# Patient Record
Sex: MALE | NOT HISPANIC OR LATINO | URBAN - METROPOLITAN AREA
[De-identification: names, ages, dates, MRNs, and addresses within clinical notes are randomized per-mention and may not be internally consistent; named-entity substitution may affect disease eponyms.]

---

## 2019-01-10 ENCOUNTER — OFFICE VISIT (OUTPATIENT)
Dept: URBAN - METROPOLITAN AREA CLINIC 76 | Facility: CLINIC | Age: 64
End: 2019-01-10
Payer: MEDICARE

## 2019-01-10 DIAGNOSIS — H35.3131 NONEXUDATIVE AGE-RELATED MACULAR DEGENERATION, BILATERAL, EARLY DRY STAGE: ICD-10-CM

## 2019-01-10 PROCEDURE — 92014 COMPRE OPH EXAM EST PT 1/>: CPT | Performed by: OPTOMETRIST

## 2019-01-10 ASSESSMENT — KERATOMETRY
OD: 46.88
OS: 47.00

## 2019-01-10 ASSESSMENT — INTRAOCULAR PRESSURE
OS: 16
OD: 16

## 2019-01-10 NOTE — IMPRESSION/PLAN
Impression: Age-related nuclear cataract, bilateral: H25.13 OU. Plan: Discussed diagnosis with patient. No treatment required at this time. Call if symptoms worsen.

## 2019-01-10 NOTE — IMPRESSION/PLAN
Impression: Nonexudative age-related macular degeneration, bilateral, early dry stage: H35.3131. OU. Plan: Discussed diagnosis in detail with patient. Counseling given about the benefits and/or risks of the Age-Related Eye Disease Study (AREDS) formulation for preventing progression of age-related macular degeneration (AMD). Add lutein 20-30 mg, zeaxanthin 2-5mg per day. Will continue to observe condition and or symptoms. Call if [place] worsens. Dispensed and explained use of [employer] grid.

## 2021-01-14 ENCOUNTER — OFFICE VISIT (OUTPATIENT)
Dept: URBAN - METROPOLITAN AREA CLINIC 76 | Facility: CLINIC | Age: 66
End: 2021-01-14
Payer: MEDICARE

## 2021-01-14 PROCEDURE — 99214 OFFICE O/P EST MOD 30 MIN: CPT | Performed by: OPTOMETRIST

## 2021-01-14 ASSESSMENT — INTRAOCULAR PRESSURE
OS: 16
OD: 16

## 2021-01-14 ASSESSMENT — KERATOMETRY
OS: 46.63
OD: 47.13

## 2021-01-14 NOTE — IMPRESSION/PLAN
Impression: Nonexudative age-related macular degeneration, bilateral, early dry stage: H35.3131. OU. Plan: Discussed diagnosis in detail with patient. Counseling given about the benefits and/or risks of the Age-Related Eye Disease Study (AREDS) formulation for preventing progression of age-related macular degeneration (AMD). Add lutein 20-30 mg, zeaxanthin 2-5mg per day. Will continue to observe condition and or symptoms. Call if [address] worsens. Dispensed and explained use of [employer] grid.

## 2021-01-14 NOTE — IMPRESSION/PLAN
Impression: Age-related nuclear cataract, bilateral: H25.13 OU. Plan: Rediscussed diagnosis with patient. No treatment required at this time. Call if symptoms worsen.

## 2022-02-17 ENCOUNTER — OFFICE VISIT (OUTPATIENT)
Dept: URBAN - METROPOLITAN AREA CLINIC 76 | Facility: CLINIC | Age: 67
End: 2022-02-17
Payer: MEDICARE

## 2022-02-17 DIAGNOSIS — H25.13 AGE-RELATED NUCLEAR CATARACT, BILATERAL: ICD-10-CM

## 2022-02-17 DIAGNOSIS — H52.4 PRESBYOPIA: ICD-10-CM

## 2022-02-17 PROCEDURE — 92014 COMPRE OPH EXAM EST PT 1/>: CPT | Performed by: OPTOMETRIST

## 2022-02-17 ASSESSMENT — KERATOMETRY
OS: 47.13
OD: 46.75

## 2022-02-17 ASSESSMENT — INTRAOCULAR PRESSURE
OS: 17
OD: 17

## 2022-02-17 ASSESSMENT — VISUAL ACUITY
OD: 20/30
OS: 20/30

## 2022-02-17 NOTE — IMPRESSION/PLAN
Impression: Presbyopia: H52.4. Bilateral. Plan: Discussed condition. New mrx given today. Pt to call with any concerns. Will dispense MRx when the refraction fee is paid Q$52. Pt and caregiver understand.

## 2022-02-17 NOTE — IMPRESSION/PLAN
Impression: Nonexudative age-related macular degeneration, bilateral, early dry stage: H35.3131. OU. Plan: Rediscussed diagnosis in detail with patient. Counseling given about the benefits and/or risks of the Age-Related Eye Disease Study (AREDS) formulation for preventing progression of age-related macular degeneration (AMD). Add lutein 20-30 mg, zeaxanthin 2-5mg per day. Will continue to observe condition and or symptoms. Call if [address] worsens. Dispensed and explained use of [employer] grid.

## 2023-02-16 ENCOUNTER — OFFICE VISIT (OUTPATIENT)
Dept: URBAN - METROPOLITAN AREA CLINIC 76 | Facility: CLINIC | Age: 68
End: 2023-02-16
Payer: MEDICARE

## 2023-02-16 DIAGNOSIS — H35.54 DYSTROPHIES PRIMARILY INVOLVING THE RETINAL PIGMENT EPITHELIUM: Primary | ICD-10-CM

## 2023-02-16 DIAGNOSIS — H25.13 AGE-RELATED NUCLEAR CATARACT, BILATERAL: ICD-10-CM

## 2023-02-16 PROCEDURE — 99214 OFFICE O/P EST MOD 30 MIN: CPT | Performed by: OPTOMETRIST

## 2023-02-16 ASSESSMENT — INTRAOCULAR PRESSURE
OS: 17
OD: 17

## 2023-02-16 ASSESSMENT — KERATOMETRY
OD: 46.75
OS: 47.25

## 2024-02-15 ENCOUNTER — OFFICE VISIT (OUTPATIENT)
Dept: URBAN - METROPOLITAN AREA CLINIC 76 | Facility: CLINIC | Age: 69
End: 2024-02-15
Payer: MEDICARE

## 2024-02-15 DIAGNOSIS — H52.4 PRESBYOPIA: ICD-10-CM

## 2024-02-15 DIAGNOSIS — H35.3131 NONEXUDATIVE AGE-RELATED MACULAR DEGENERATION, BILATERAL, EARLY DRY STAGE: Primary | ICD-10-CM

## 2024-02-15 DIAGNOSIS — H25.13 AGE-RELATED NUCLEAR CATARACT, BILATERAL: ICD-10-CM

## 2024-02-15 PROCEDURE — 92134 CPTRZ OPH DX IMG PST SGM RTA: CPT | Performed by: OPTOMETRIST

## 2024-02-15 PROCEDURE — 99214 OFFICE O/P EST MOD 30 MIN: CPT | Performed by: OPTOMETRIST

## 2024-02-15 ASSESSMENT — INTRAOCULAR PRESSURE
OD: 16
OS: 16

## 2024-02-15 ASSESSMENT — KERATOMETRY
OD: 46.75
OS: 47.00

## 2025-02-13 ENCOUNTER — OFFICE VISIT (OUTPATIENT)
Dept: URBAN - METROPOLITAN AREA CLINIC 76 | Facility: CLINIC | Age: 70
End: 2025-02-13
Payer: MEDICARE

## 2025-02-13 DIAGNOSIS — H35.3131 NONEXUDATIVE AGE-RELATED MACULAR DEGENERATION, BILATERAL, EARLY DRY STAGE: Primary | ICD-10-CM

## 2025-02-13 DIAGNOSIS — H25.13 AGE-RELATED NUCLEAR CATARACT, BILATERAL: ICD-10-CM

## 2025-02-13 PROCEDURE — 99214 OFFICE O/P EST MOD 30 MIN: CPT | Performed by: OPTOMETRIST

## 2025-02-13 PROCEDURE — 92134 CPTRZ OPH DX IMG PST SGM RTA: CPT | Performed by: OPTOMETRIST

## 2025-02-13 ASSESSMENT — KERATOMETRY
OD: 46.88
OS: 46.88

## 2025-02-13 ASSESSMENT — INTRAOCULAR PRESSURE
OD: 16
OS: 16